# Patient Record
(demographics unavailable — no encounter records)

---

## 2025-01-14 NOTE — HISTORY OF PRESENT ILLNESS
[Vaginal Wall Prolapse] : no [Rectal Prolapse] : mild [Stress Incontinence] : mild [Unable To Restrain Bowel Movement] : no [Urinary Frequency] : no [Feelings Of Urinary Urgency] : mild [x1] : nocturia once nightly [Urinary Tract Infection] : mild [Constipation Obstructed Defecation] : mild [Pelvic Pain] : no [Vaginal Pain] : mild [] : years ago [FreeTextEntry1] : LUZ is a 49 year female who presents for f/u on POP and JOSE. Here to discuss JATINDER Replaced by [facility] BS SCP SLING CYSTO scheduled for 02/13/2025. Previously had surgery booked in 05/2024 but surgery canceled.    UDS with +JOSE, no DO, no UUI on 02/22/2024   TVUS 11/06/2023 - Fibroid uterus. Normal ovaries.  EMB 12/2023 Negative pap 11/16/2023 - Reactive cellular changes present. negative HPV testing

## 2025-01-14 NOTE — HISTORY OF PRESENT ILLNESS
[Vaginal Wall Prolapse] : no [Rectal Prolapse] : mild [Stress Incontinence] : mild [Unable To Restrain Bowel Movement] : no [Urinary Frequency] : no [Feelings Of Urinary Urgency] : mild [x1] : nocturia once nightly [Urinary Tract Infection] : mild [Constipation Obstructed Defecation] : mild [Pelvic Pain] : no [Vaginal Pain] : mild [] : years ago [FreeTextEntry1] : LUZ is a 49 year female who presents for f/u on POP and JOSE. Here to discuss JATINDER [facility] BS SCP SLING CYSTO scheduled for 02/13/2025. Previously had surgery booked in 05/2024 but surgery canceled.    UDS with +JOSE, no DO, no UUI on 02/22/2024   TVUS 11/06/2023 - Fibroid uterus. Normal ovaries.  EMB 12/2023 Negative pap 11/16/2023 - Reactive cellular changes present. negative HPV testing

## 2025-01-14 NOTE — PHYSICAL EXAM
[Chaperone Present] : A chaperone was present in the examining room during all aspects of the physical examination [No Acute Distress] : in no acute distress [Well developed] : well developed [Well Nourished] : ~L well nourished [Oriented x3] : oriented to person, place, and time [FreeTextEntry2] :  Melly Avila

## 2025-01-14 NOTE — ADDENDUM
[FreeTextEntry1] : This note was written by Melly Avila, acting as the  for Dr. Soliman. This note accurately reflects the work and decisions made by Dr. Soliman.

## 2025-01-14 NOTE — DISCUSSION/SUMMARY
[FreeTextEntry1] : LUZ is a 49 year female who presents for f/u on POP and JOSE. Here to discuss JATINDER [facility] BS SCP SLING CYSTO scheduled for 02/13/2025.   The patient presented to the office today for counseling regarding her decision for possible pelvic reconstructive surgery. All pertinent prior studies including urodynamic testing were reviewed.  The patient was counseled regarding alternative non-surgical therapies as well as the prognosis with no intervention.  The patient was advised regarding various surgical options including abdominal, robotic/laparoscopic and vaginal approaches. The risks and benefits of surgery using endogenous tissue only versus the use of graft insertion (mesh) were fully reviewed. She was informed of the potential for improved durability and the inherent risk of graft use including but not limited to infection, erosion and chronic inflammation, acute and chronic pain, pain with intercourse (both of which may be refractory to treatment) fistula, cervical elongation, disturbance in bowel or bladder function, any of which may require additional surgery for mesh revision. She is aware that the mesh used in her surgery is a permanent mesh. The patient was advised regarding the July 2011 FDA notification regarding these issues and provided the website address for further reference www.fda.gov.  The general risks of the surgery were reviewed including, but not limited to infection, bleeding, including transfusion, surrounding organ or tissue injury (bladder, rectum, bowel, urethra, ureters, nerves vessels or muscles), failure meaning recurrent prolapse and/or continued leaking, voiding dysfunction, needing to go home with a catheter, pain with sex, blood clots, and anesthesia.  The approximate length of the surgery, hospital stay and postoperative recovery period were reviewed, including a general overview of convalescence and postoperative follow-up.  The patient is aware that learner's (medical students/residents/fellows) may be participating in a pelvic exam under anesthesia.  The patient verbalized a desire to proceed with the surgery. Appropriate informed consent was obtained. All questions were answered to the patient's satisfaction.  Preoperative labs ordered including CBC/BMP/PT/PT/INR IUGA SCP/MUS given  [] consent for pelvic EUA, robotic [facility]A/BS/SCP/sling/cystoscopy/ possible a/p repair, possible laparotomy, any other indicated procedures.

## 2025-02-14 NOTE — DISCUSSION/SUMMARY
[FreeTextEntry1] : During voiding trial 500ml of Sterile Water was instilled into the bladder. Pt had strong urge to void, dill catheter d/laura and she was able to void 450ml. Case reviewed with MD Soliman. Instructions on post op urinary retention provided. All questions answered. RTO for scheduled 2-week POA or sooner if need arises.

## 2025-02-14 NOTE — SUBJECTIVE
[FreeTextEntry1] : Pt overall doing well  [FreeTextEntry8] : None new [FreeTextEntry7] : Mild discomfort at robotic sites and mild cramping [FreeTextEntry6] : Eating and drinking well. Denies n/v [FreeTextEntry5] : Dasilva catheter in place [FreeTextEntry4] : Pt reports no BM yet. Pt taking Mirlax [FreeTextEntry3] : Normal [FreeTextEntry2] : Unchanged

## 2025-03-06 NOTE — SUBJECTIVE
[FreeTextEntry1] : Efren states she has been well no current concerns, she is happy with the outcome of her surgery  [FreeTextEntry8] : no changes  [FreeTextEntry7] : denied  [FreeTextEntry6] : good no n/v [FreeTextEntry5] : no leakage of urine with laugh cough or sneeze, good urine flow states she empties well no dysuria no foul smelling urine  [FreeTextEntry4] : + bm  [FreeTextEntry3] : good  [FreeTextEntry2] : unchanged

## 2025-03-06 NOTE — ASSESSMENT
[FreeTextEntry1] : 50y/o female post robotic surgery with Dr. Soliman pt is doing well no current concerns op report and pathology reviewed all questions answered pt to keep scheduled appointment advised to call if any additional questions or concerns arise.

## 2025-04-01 NOTE — SUBJECTIVE
[FreeTextEntry1] : Happy doing well no current concerns  [FreeTextEntry8] : no changes  [FreeTextEntry7] : denied  [FreeTextEntry6] : good  [FreeTextEntry5] : no leakage of urine with laugh cough or sneeze, good urine flow no foul odor, good urine stream  [FreeTextEntry4] : +bm will takes miralax 1/2 capful  [FreeTextEntry3] : good  [FreeTextEntry2] : unchanged

## 2025-04-01 NOTE — OBJECTIVE
[Post Void Residual ____ ml] : Post Void Residual was [unfilled] ml [FreeTextEntry3] : no exposure, no erosion no sutures noted, no prolapse non tender exam

## 2025-07-29 NOTE — DISCUSSION/SUMMARY
[FreeTextEntry1] : LUZ is a 49 year female who presents for f/u s/p JATINDER [facility] BS left ovarian cystectomy SCP MUS cysto on 02/13/2025.  Happy with surgery results. Discussed leaning forward when voiding.    f/u as needed All questions answered.

## 2025-07-29 NOTE — PHYSICAL EXAM
[MA] : MA [No Acute Distress] : in no acute distress [Well developed] : well developed [Well Nourished] : ~L well nourished [Oriented x3] : oriented to person, place, and time [No Edema] : ~T edema was not present [Warm and Dry] : was warm and dry to touch [Labia Majora] : were normal [Labia Minora] : were normal [Normal Appearance] : general appearance was normal [Normal] : was normal [Aa ____] : Aa [unfilled] [Ba ____] : Ba [unfilled] [FreeTextEntry2] :  Melly Avila  [Cough] : no cough [Tenderness] : ~T no ~M abdominal tenderness observed [Distended] : not distended [FreeTextEntry4] : excellent support, healing well, no mass or tenderness, no mesh exposure, graft nontender

## 2025-07-29 NOTE — HISTORY OF PRESENT ILLNESS
Addended by: BRITTANY GALICIA on: 8/5/2019 11:24 AM     Modules accepted: Orders     [Vaginal Wall Prolapse] : no [Rectal Prolapse] : no [Stress Incontinence] : mild [Unable To Restrain Bowel Movement] : no [Urinary Frequency] : no [Feelings Of Urinary Urgency] : mild [x1] : nocturia once nightly [Urinary Tract Infection] : mild [Constipation Obstructed Defecation] : mild [] : no [Pelvic Pain] : no [FreeTextEntry1] : LUZ is a 49 year female who presents for f/u s/p JATINDER [facility] BS left ovarian cystectomy SCP MUS cysto on 02/13/2025. Last seen on 04/01/2025 for post-op visit and was doing well. Today reports she is doing well. Thinks prolapse may be dropping again but is unsure, feels she has to move and press to empty her bladder. Denies any urinary leakage, bleeding, incomplete emptying, pain.